# Patient Record
Sex: FEMALE | Employment: UNEMPLOYED | URBAN - METROPOLITAN AREA
[De-identification: names, ages, dates, MRNs, and addresses within clinical notes are randomized per-mention and may not be internally consistent; named-entity substitution may affect disease eponyms.]

---

## 2020-01-05 ENCOUNTER — OFFICE VISIT (OUTPATIENT)
Dept: URGENT CARE | Facility: URGENT CARE | Age: 35
End: 2020-01-05
Payer: COMMERCIAL

## 2020-01-05 VITALS
TEMPERATURE: 100 F | RESPIRATION RATE: 18 BRPM | WEIGHT: 170 LBS | SYSTOLIC BLOOD PRESSURE: 157 MMHG | HEIGHT: 65 IN | HEART RATE: 92 BPM | DIASTOLIC BLOOD PRESSURE: 90 MMHG | OXYGEN SATURATION: 98 % | BODY MASS INDEX: 28.32 KG/M2

## 2020-01-05 DIAGNOSIS — R42 DIZZINESS: ICD-10-CM

## 2020-01-05 DIAGNOSIS — R50.9 FEVER AND CHILLS: Primary | ICD-10-CM

## 2020-01-05 DIAGNOSIS — Z72.0 TOBACCO ABUSE: ICD-10-CM

## 2020-01-05 DIAGNOSIS — R20.0 LEFT ARM NUMBNESS: ICD-10-CM

## 2020-01-05 DIAGNOSIS — R11.0 NAUSEA: ICD-10-CM

## 2020-01-05 LAB
ALBUMIN UR-MCNC: NEGATIVE MG/DL
ANION GAP SERPL CALCULATED.3IONS-SCNC: 6 MMOL/L (ref 3–14)
APPEARANCE UR: CLEAR
BASOPHILS # BLD AUTO: 0 10E9/L (ref 0–0.2)
BASOPHILS NFR BLD AUTO: 0.2 %
BILIRUB UR QL STRIP: NEGATIVE
BUN SERPL-MCNC: 15 MG/DL (ref 7–30)
CALCIUM SERPL-MCNC: 8.9 MG/DL (ref 8.5–10.1)
CHLORIDE SERPL-SCNC: 102 MMOL/L (ref 94–109)
CO2 SERPL-SCNC: 27 MMOL/L (ref 20–32)
COLOR UR AUTO: YELLOW
CREAT SERPL-MCNC: 0.97 MG/DL (ref 0.52–1.04)
DIFFERENTIAL METHOD BLD: NORMAL
EOSINOPHIL # BLD AUTO: 0.4 10E9/L (ref 0–0.7)
EOSINOPHIL NFR BLD AUTO: 4 %
ERYTHROCYTE [DISTWIDTH] IN BLOOD BY AUTOMATED COUNT: 12.7 % (ref 10–15)
GFR SERPL CREATININE-BSD FRML MDRD: 75 ML/MIN/{1.73_M2}
GLUCOSE SERPL-MCNC: 143 MG/DL (ref 70–99)
GLUCOSE UR STRIP-MCNC: NEGATIVE MG/DL
HCG UR QL: NEGATIVE
HCT VFR BLD AUTO: 38.9 % (ref 35–47)
HGB BLD-MCNC: 13.2 G/DL (ref 11.7–15.7)
HGB UR QL STRIP: NEGATIVE
KETONES UR STRIP-MCNC: NEGATIVE MG/DL
LEUKOCYTE ESTERASE UR QL STRIP: NEGATIVE
LYMPHOCYTES # BLD AUTO: 3.5 10E9/L (ref 0.8–5.3)
LYMPHOCYTES NFR BLD AUTO: 32.4 %
MCH RBC QN AUTO: 30.2 PG (ref 26.5–33)
MCHC RBC AUTO-ENTMCNC: 33.9 G/DL (ref 31.5–36.5)
MCV RBC AUTO: 89 FL (ref 78–100)
MONOCYTES # BLD AUTO: 0.6 10E9/L (ref 0–1.3)
MONOCYTES NFR BLD AUTO: 5.6 %
NEUTROPHILS # BLD AUTO: 6.3 10E9/L (ref 1.6–8.3)
NEUTROPHILS NFR BLD AUTO: 57.8 %
NITRATE UR QL: NEGATIVE
PH UR STRIP: 5.5 PH (ref 5–7)
PLATELET # BLD AUTO: 309 10E9/L (ref 150–450)
POTASSIUM SERPL-SCNC: 3.5 MMOL/L (ref 3.4–5.3)
RBC # BLD AUTO: 4.37 10E12/L (ref 3.8–5.2)
RBC #/AREA URNS AUTO: NORMAL /HPF
SODIUM SERPL-SCNC: 135 MMOL/L (ref 133–144)
SOURCE: NORMAL
SP GR UR STRIP: 1.02 (ref 1–1.03)
TROPONIN I SERPL-MCNC: <0.015 UG/L (ref 0–0.04)
UROBILINOGEN UR STRIP-ACNC: 0.2 EU/DL (ref 0.2–1)
WBC # BLD AUTO: 10.9 10E9/L (ref 4–11)
WBC #/AREA URNS AUTO: NORMAL /HPF

## 2020-01-05 PROCEDURE — 84484 ASSAY OF TROPONIN QUANT: CPT | Performed by: FAMILY MEDICINE

## 2020-01-05 PROCEDURE — 93000 ELECTROCARDIOGRAM COMPLETE: CPT | Performed by: FAMILY MEDICINE

## 2020-01-05 PROCEDURE — 85025 COMPLETE CBC W/AUTO DIFF WBC: CPT | Performed by: FAMILY MEDICINE

## 2020-01-05 PROCEDURE — 99205 OFFICE O/P NEW HI 60 MIN: CPT | Performed by: FAMILY MEDICINE

## 2020-01-05 PROCEDURE — 81025 URINE PREGNANCY TEST: CPT | Performed by: FAMILY MEDICINE

## 2020-01-05 PROCEDURE — 81001 URINALYSIS AUTO W/SCOPE: CPT | Performed by: FAMILY MEDICINE

## 2020-01-05 PROCEDURE — 80048 BASIC METABOLIC PNL TOTAL CA: CPT | Performed by: FAMILY MEDICINE

## 2020-01-05 PROCEDURE — 36415 COLL VENOUS BLD VENIPUNCTURE: CPT | Performed by: FAMILY MEDICINE

## 2020-01-05 RX ORDER — ATORVASTATIN CALCIUM 20 MG/1
20 TABLET, FILM COATED ORAL
COMMUNITY
Start: 2019-01-25 | End: 2022-01-10

## 2020-01-05 RX ORDER — ALBUTEROL SULFATE 90 UG/1
1-2 AEROSOL, METERED RESPIRATORY (INHALATION)
COMMUNITY
Start: 2019-09-01

## 2020-01-05 RX ORDER — ESCITALOPRAM OXALATE 20 MG/1
40 TABLET ORAL
COMMUNITY
Start: 2018-03-31

## 2020-01-05 RX ORDER — ARIPIPRAZOLE 10 MG/1
10 TABLET ORAL
COMMUNITY
Start: 2018-03-06

## 2020-01-05 RX ORDER — FLUTICASONE PROPIONATE 50 MCG
50 SPRAY, SUSPENSION (ML) NASAL
COMMUNITY
Start: 2018-01-16

## 2020-01-05 ASSESSMENT — MIFFLIN-ST. JEOR: SCORE: 1466.99

## 2020-01-06 NOTE — PROGRESS NOTES
"SUBJECTIVE: Joanne Berrios is a 34 year old female presenting with a chief complaint of Left arm numbness after hand was sore now nausea and dizziness with feeling very anxious.  Of note, she did not notice a fever until after nurse took Temp.  Onset of symptoms was today ago.  Course of illness is same.    Severity moderate  Current and Associated symptoms: none  Treatment measures tried include None tried.  Predisposing factors include HX of anxiety.    Past Medical History:   Diagnosis Date     Anxiety      Depressive disorder      Hyperlipidemia        No past surgical history on file.    Family History   Problem Relation Age of Onset     Heart Disease Father        Social History     Tobacco Use     Smoking status: Not on file     Smokeless tobacco: Never Used   Substance Use Topics     Alcohol use: Not on file        Allergies   Allergen Reactions     Cats      Other  [No Clinical Screening - See Comments]      hayfever     Review Of Systems  Skin: negative  Eyes: negative  Ears/Nose/Throat: negative  Respiratory: No shortness of breath, dyspnea on exertion, cough, or hemoptysis  Cardiovascular: negative for CP  Gastrointestinal: negative  Genitourinary: negative  Musculoskeletal: as above  Neurologic: negative  Psychiatric: anxious      OBJECTIVE:  BP (!) 157/90   Pulse 92   Temp 100  F (37.8  C) (Tympanic)   Resp 18   Ht 1.651 m (5' 5\")   Wt 77.1 kg (170 lb)   SpO2 98%   BMI 28.29 kg/m  GENERAL APPEARANCE: healthy, alert and no distress  EYES: EOMI,  PERRL, conjunctiva clear  HENT: ear canals and TM's normal.  Nose and mouth without ulcers, erythema or lesions  NECK: supple, nontender, no lymphadenopathy  RESP: lungs clear to auscultation - no rales, rhonchi or wheezes  CV: regular rates and rhythm, normal S1 S2, no murmur noted  ABDOMEN:  soft, nontender, no HSM or masses and bowel sounds normal  NEURO: Normal strength and tone, sensory exam grossly normal,  normal speech and mentation  SKIN: no " suspicious lesions or rashes  No lower ext edema    EKG NSR without acute st changes      ICD-10-CM    1. Fever and chills R50.9 CBC with platelets differential     UA with Microscopic reflex to Culture     Troponin I     Basic metabolic panel     HCG qualitative urine     EKG 12-lead complete w/read - Clinics   2. Nausea R11.0 CBC with platelets differential     UA with Microscopic reflex to Culture     Troponin I     Basic metabolic panel     HCG qualitative urine     EKG 12-lead complete w/read - Clinics   3. Dizziness R42 CBC with platelets differential     UA with Microscopic reflex to Culture     Troponin I     Basic metabolic panel     HCG qualitative urine     EKG 12-lead complete w/read - Clinics   4. Left arm numbness R20.0 CBC with platelets differential     UA with Microscopic reflex to Culture     Troponin I     Basic metabolic panel     HCG qualitative urine     EKG 12-lead complete w/read - Clinics   5. Tobacco abuse Z72.0 CBC with platelets differential     UA with Microscopic reflex to Culture     Troponin I     Basic metabolic panel     HCG qualitative urine     EKG 12-lead complete w/read - Clinics     No MI by normal EKG and Troponin  OTC anti inflammatories for possible carpal tunnel  ED if worse  F/u PCP recheck

## 2022-01-10 ENCOUNTER — NURSE TRIAGE (OUTPATIENT)
Dept: NURSING | Facility: CLINIC | Age: 37
End: 2022-01-10
Payer: COMMERCIAL

## 2022-01-10 ENCOUNTER — OFFICE VISIT (OUTPATIENT)
Dept: INTERNAL MEDICINE | Facility: CLINIC | Age: 37
End: 2022-01-10
Payer: COMMERCIAL

## 2022-01-10 VITALS
OXYGEN SATURATION: 99 % | BODY MASS INDEX: 29.82 KG/M2 | DIASTOLIC BLOOD PRESSURE: 84 MMHG | WEIGHT: 179 LBS | HEIGHT: 65 IN | TEMPERATURE: 97.3 F | HEART RATE: 91 BPM | SYSTOLIC BLOOD PRESSURE: 126 MMHG | RESPIRATION RATE: 18 BRPM

## 2022-01-10 DIAGNOSIS — R30.0 DYSURIA: Primary | ICD-10-CM

## 2022-01-10 LAB
ALBUMIN UR-MCNC: NEGATIVE MG/DL
APPEARANCE UR: CLEAR
BACTERIA #/AREA URNS HPF: ABNORMAL /HPF
BILIRUB UR QL STRIP: NEGATIVE
COLOR UR AUTO: YELLOW
GLUCOSE UR STRIP-MCNC: NEGATIVE MG/DL
HGB UR QL STRIP: ABNORMAL
KETONES UR STRIP-MCNC: NEGATIVE MG/DL
LEUKOCYTE ESTERASE UR QL STRIP: NEGATIVE
NITRATE UR QL: NEGATIVE
PH UR STRIP: 7 [PH] (ref 5–7)
RBC #/AREA URNS AUTO: ABNORMAL /HPF
SP GR UR STRIP: <=1.005 (ref 1–1.03)
SQUAMOUS #/AREA URNS AUTO: ABNORMAL /LPF
UROBILINOGEN UR STRIP-ACNC: 0.2 E.U./DL
WBC #/AREA URNS AUTO: ABNORMAL /HPF

## 2022-01-10 PROCEDURE — 81001 URINALYSIS AUTO W/SCOPE: CPT | Performed by: NURSE PRACTITIONER

## 2022-01-10 PROCEDURE — 99213 OFFICE O/P EST LOW 20 MIN: CPT | Performed by: NURSE PRACTITIONER

## 2022-01-10 RX ORDER — PROPRANOLOL HYDROCHLORIDE 20 MG/1
20 TABLET ORAL 2 TIMES DAILY
COMMUNITY

## 2022-01-10 RX ORDER — CLONAZEPAM 0.5 MG/1
0.5 TABLET ORAL PRN
COMMUNITY

## 2022-01-10 ASSESSMENT — MIFFLIN-ST. JEOR: SCORE: 1497.82

## 2022-01-10 NOTE — PROGRESS NOTES
"  Assessment & Plan     Dysuria  - Urine was negative for obvious infectious source.  Small amount of blood was noted.  A few bacteria were noted.  Does not meet criteria for urine culture.  - Reviewed with patient.  Advised to monitor symptoms for now.  It is possible that this is an early onset UTI.  She does have some mild low back pain but no flank tenderness to palpation.  If her symptoms worsen or fail to improve she was advised to return for care or reach out to her urology clinic for further recommendations.  If ongoing symptoms consider CT abdomen pelvis stone protocol.  Patient agreeable to plan.  - UA macro with reflex to Microscopic and Culture - Clinc Collect    At the end of our visit, patient mentioned a chronic history of tailbone pain.  Endorses having had an injury to her tailbone years ago.  Given the context of time, patient was advised to follow-up with her primary or make a follow-up appointment to discuss.  Patient agreed to plan.  }     Tobacco Cessation:   reports that she has been smoking. She has never used smokeless tobacco.      BMI:   Estimated body mass index is 29.79 kg/m  as calculated from the following:    Height as of this encounter: 1.651 m (5' 5\").    Weight as of this encounter: 81.2 kg (179 lb).       See Patient Instructions    Return if symptoms worsen or fail to improve.    SLOANE Stevens CNP  [facility] 36 year [facility] who presents for the following health issues:    HPI     Genitourinary - Female  Onset/Duration: 2 days  Description:   Painful urination (Dysuria): YES           Frequency: YES  Blood in urine (Hematuria): YES  Delay in urine (Hesitency): no  Intensity: moderate  Progression of Symptoms:  worsening  Accompanying Signs & Symptoms:  Fever/chills: no  Flank pain: YES  Nausea and vomiting: no  Vaginal symptoms: none  Abdominal/Pelvic Pain: no  History:   History of frequent UTI s: YES  History " "of kidney stones: no  Sexually Active: YES  Possibility of pregnancy: No  Precipitating or alleviating factors: None  Therapies tried and outcome:  none     Patient is seen in clinic today for UTI concern.  Patient normally follows with [facility] however, this clinic was closer and available for an appointment today.    She reports urinary symptoms starting last night.  She has burning with urination at the very end of voiding.  Feels like she needs to void frequently though she had already voided.  Symptoms have been present for 1 day.  Slight lower back pain.  No fevers or chills.  She believes there is some blood in her urine.  She has a past medical history significant for chronic urinary infections and was previously on Keflex suppressive therapy.  She reports that this was stopped in 2020 and she had done pretty well since that time.  She reports that her last UTI was in June, 2021.    Review of Systems   CONSTITUTIONAL:NEGATIVE for fever, chills, change in weight  RESP:NEGATIVE for significant cough or SOB  CV: NEGATIVE for chest pain, palpitations or peripheral edema  : Urinary frequency with burning.  History of frequent UTIs in the past, previously on Keflex suppressive therapy.      Objective    /84 (BP Location: Left arm, Patient Position: Chair, Cuff Size: Adult Regular)   Pulse 91   Temp 97.3  F (36.3  C) (Temporal)   Resp 18   Ht 1.651 m (5' 5\")   Wt 81.2 kg (179 lb)   SpO2 99%   BMI 29.79 kg/m    Body mass index is 29.79 kg/m .     Physical Exam   GENERAL: healthy, alert and no distress  EYES: Eyes grossly normal to inspection, PERRL and conjunctivae and sclerae normal  HENT: ear canals and TM's normal, nose and mouth without ulcers or lesions  NECK: no adenopathy, no asymmetry, masses, or scars and thyroid normal to palpation  RESP: lungs clear to auscultation - no rales, rhonchi or wheezes  CV: regular rate and rhythm, normal S1 S2, no S3 or S4, no murmur, click or " rub, no peripheral edema and peripheral pulses strong  ABDOMEN: soft, nontender, no hepatosplenomegaly, no masses and bowel sounds normal  MS: no gross musculoskeletal defects noted, no edema  SKIN: no suspicious lesions or rashes  NEURO: Normal strength and tone, mentation intact and speech normal  PSYCH: mentation appears normal, affect normal/bright    -Care everywhere briefly reviewed

## 2022-01-10 NOTE — TELEPHONE ENCOUNTER
Triage Call    Pt calling to say she has had a UTI developing, and overnight her symptoms have gotten much worse.  Currently has frequency, blood in urine, pain and burning.  No current fever.    Triaged to See Physician within 24 hours, and Care Advice given per Urinary Symptoms Guideline.    Transferred caller to the  for help to arrange an appointment today.    Jenny Yu RN        Reason for Disposition    Urinating more frequently than usual (i.e., frequency)    Additional Information    Negative: Shock suspected (e.g., cold/pale/clammy skin, too weak to stand, low BP, rapid pulse)    Negative: Sounds like a life-threatening emergency to the triager    Negative: Followed a genital area injury    Negative: Followed a genital area injury (penis, scrotum)    Negative: Vaginal discharge    Negative: Pus (white, yellow) or bloody discharge from end of penis    Negative: [1] Taking antibiotic for urinary tract infection (UTI) AND [2] female    Negative: [1] Taking antibiotic for urinary tract infection (UTI) AND [2] male    Negative: [1] Discomfort (pain, burning or stinging) when passing urine AND [2] pregnant    Negative: [1] Discomfort (pain, burning or stinging) when passing urine AND [2] postpartum (from 0 to 6 weeks after delivery)    Negative: [1] Discomfort (pain, burning or stinging) when passing urine AND [2] female    Negative: [1] Discomfort (pain, burning or stinging) when passing urine AND [2] male    Negative: Pain or itching in the vulvar area    Negative: Pain in scrotum is main symptom    Negative: Blood in the urine is main symptom    Negative: Symptoms arising from use of a urinary catheter (Cage or Coude)    Negative: [1] Unable to urinate (or only a few drops) > 4 hours AND     [2] bladder feels very full (e.g., palpable bladder or strong urge to urinate)    Negative: [1] Decreased urination and [2] drinking very little AND [2] dehydration suspected (e.g., dark urine, no urine  > 12 hours, very dry mouth, very lightheaded)    Negative: Patient sounds very sick or weak to the triager    Negative: Fever > 100.4 F (38.0 C)    Protocols used: URINARY SYMPTOMS-A-AH

## 2022-01-10 NOTE — PATIENT INSTRUCTIONS
-Your urinalysis looked fine- no evidence of infection at this time.  Small blood  -Watch symptoms for now, if not improving or worsening, follow up

## 2022-09-19 ENCOUNTER — HOSPITAL ENCOUNTER (EMERGENCY)
Facility: CLINIC | Age: 37
Discharge: HOME | End: 2022-09-19
Attending: EMERGENCY MEDICINE | Admitting: EMERGENCY MEDICINE
Payer: COMMERCIAL

## 2022-09-19 ENCOUNTER — APPOINTMENT (OUTPATIENT)
Dept: GENERAL RADIOLOGY | Facility: CLINIC | Age: 37
End: 2022-09-19
Attending: EMERGENCY MEDICINE
Payer: COMMERCIAL

## 2022-09-19 VITALS
RESPIRATION RATE: 22 BRPM | OXYGEN SATURATION: 98 % | SYSTOLIC BLOOD PRESSURE: 129 MMHG | TEMPERATURE: 99 F | DIASTOLIC BLOOD PRESSURE: 87 MMHG | HEART RATE: 84 BPM

## 2022-09-19 DIAGNOSIS — R06.02 SHORTNESS OF BREATH: ICD-10-CM

## 2022-09-19 LAB
ALBUMIN SERPL-MCNC: 4.4 G/DL (ref 3.4–5)
ALP SERPL-CCNC: 68 U/L (ref 40–150)
ALT SERPL W P-5'-P-CCNC: 27 U/L (ref 0–50)
ANION GAP SERPL CALCULATED.3IONS-SCNC: 12 MMOL/L (ref 3–14)
AST SERPL W P-5'-P-CCNC: 13 U/L (ref 0–45)
ATRIAL RATE - MUSE: 94 BPM
BASOPHILS # BLD AUTO: 0 10E3/UL (ref 0–0.2)
BASOPHILS NFR BLD AUTO: 0 %
BILIRUB SERPL-MCNC: 0.4 MG/DL (ref 0.2–1.3)
BUN SERPL-MCNC: 11 MG/DL (ref 7–30)
CALCIUM SERPL-MCNC: 9.8 MG/DL (ref 8.5–10.1)
CHLORIDE BLD-SCNC: 104 MMOL/L (ref 94–109)
CO2 SERPL-SCNC: 20 MMOL/L (ref 20–32)
CREAT SERPL-MCNC: 0.8 MG/DL (ref 0.52–1.04)
D DIMER PPP FEU-MCNC: <0.27 UG/ML FEU (ref 0–0.5)
DIASTOLIC BLOOD PRESSURE - MUSE: NORMAL MMHG
EOSINOPHIL # BLD AUTO: 0.2 10E3/UL (ref 0–0.7)
EOSINOPHIL NFR BLD AUTO: 2 %
ERYTHROCYTE [DISTWIDTH] IN BLOOD BY AUTOMATED COUNT: 11.9 % (ref 10–15)
GFR SERPL CREATININE-BSD FRML MDRD: >90 ML/MIN/1.73M2
GLUCOSE BLD-MCNC: 122 MG/DL (ref 70–99)
HCG SERPL QL: NEGATIVE
HCT VFR BLD AUTO: 39.8 % (ref 35–47)
HGB BLD-MCNC: 13.6 G/DL (ref 11.7–15.7)
HOLD SPECIMEN: 0
HOLD SPECIMEN: NORMAL
IMM GRANULOCYTES # BLD: 0 10E3/UL
IMM GRANULOCYTES NFR BLD: 0 %
INTERPRETATION ECG - MUSE: NORMAL
LYMPHOCYTES # BLD AUTO: 2.6 10E3/UL (ref 0.8–5.3)
LYMPHOCYTES NFR BLD AUTO: 28 %
MCH RBC QN AUTO: 30.2 PG (ref 26.5–33)
MCHC RBC AUTO-ENTMCNC: 34.2 G/DL (ref 31.5–36.5)
MCV RBC AUTO: 88 FL (ref 78–100)
MONOCYTES # BLD AUTO: 0.6 10E3/UL (ref 0–1.3)
MONOCYTES NFR BLD AUTO: 7 %
NEUTROPHILS # BLD AUTO: 5.7 10E3/UL (ref 1.6–8.3)
NEUTROPHILS NFR BLD AUTO: 63 %
NRBC # BLD AUTO: 0 10E3/UL
NRBC BLD AUTO-RTO: 0 /100
NT-PROBNP SERPL-MCNC: 64 PG/ML (ref 0–450)
P AXIS - MUSE: 41 DEGREES
PLATELET # BLD AUTO: 364 10E3/UL (ref 150–450)
POTASSIUM BLD-SCNC: 3.2 MMOL/L (ref 3.4–5.3)
PR INTERVAL - MUSE: 132 MS
PROT SERPL-MCNC: 8.6 G/DL (ref 6.8–8.8)
QRS DURATION - MUSE: 82 MS
QT - MUSE: 352 MS
QTC - MUSE: 440 MS
R AXIS - MUSE: 45 DEGREES
RBC # BLD AUTO: 4.5 10E6/UL (ref 3.8–5.2)
SODIUM SERPL-SCNC: 136 MMOL/L (ref 133–144)
SYSTOLIC BLOOD PRESSURE - MUSE: NORMAL MMHG
T AXIS - MUSE: 27 DEGREES
TROPONIN I SERPL HS-MCNC: <3 NG/L
VENTRICULAR RATE- MUSE: 94 BPM
WBC # BLD AUTO: 9.1 10E3/UL (ref 4–11)

## 2022-09-19 PROCEDURE — 99285 EMERGENCY DEPT VISIT HI MDM: CPT | Mod: 25

## 2022-09-19 PROCEDURE — 80053 COMPREHEN METABOLIC PANEL: CPT | Performed by: EMERGENCY MEDICINE

## 2022-09-19 PROCEDURE — 93005 ELECTROCARDIOGRAM TRACING: CPT

## 2022-09-19 PROCEDURE — 96360 HYDRATION IV INFUSION INIT: CPT

## 2022-09-19 PROCEDURE — 84703 CHORIONIC GONADOTROPIN ASSAY: CPT | Performed by: EMERGENCY MEDICINE

## 2022-09-19 PROCEDURE — 85025 COMPLETE CBC W/AUTO DIFF WBC: CPT | Performed by: EMERGENCY MEDICINE

## 2022-09-19 PROCEDURE — 84484 ASSAY OF TROPONIN QUANT: CPT | Performed by: EMERGENCY MEDICINE

## 2022-09-19 PROCEDURE — 258N000003 HC RX IP 258 OP 636: Performed by: EMERGENCY MEDICINE

## 2022-09-19 PROCEDURE — 85379 FIBRIN DEGRADATION QUANT: CPT | Performed by: EMERGENCY MEDICINE

## 2022-09-19 PROCEDURE — 36415 COLL VENOUS BLD VENIPUNCTURE: CPT | Performed by: EMERGENCY MEDICINE

## 2022-09-19 PROCEDURE — 83880 ASSAY OF NATRIURETIC PEPTIDE: CPT | Performed by: EMERGENCY MEDICINE

## 2022-09-19 PROCEDURE — 71046 X-RAY EXAM CHEST 2 VIEWS: CPT

## 2022-09-19 PROCEDURE — 250N000013 HC RX MED GY IP 250 OP 250 PS 637: Performed by: EMERGENCY MEDICINE

## 2022-09-19 RX ORDER — POTASSIUM CHLORIDE 1500 MG/1
20 TABLET, EXTENDED RELEASE ORAL ONCE
Status: COMPLETED | OUTPATIENT
Start: 2022-09-19 | End: 2022-09-19

## 2022-09-19 RX ADMIN — SODIUM CHLORIDE 1000 ML: 9 INJECTION, SOLUTION INTRAVENOUS at 18:53

## 2022-09-19 RX ADMIN — POTASSIUM CHLORIDE 20 MEQ: 1500 TABLET, EXTENDED RELEASE ORAL at 18:54

## 2022-09-19 ASSESSMENT — ENCOUNTER SYMPTOMS
ABDOMINAL PAIN: 0
CHILLS: 0
SORE THROAT: 0
SHORTNESS OF BREATH: 1
MYALGIAS: 0
NAUSEA: 1
VOMITING: 0
FEVER: 0
WHEEZING: 0
COUGH: 1
BLOOD IN STOOL: 0
RHINORRHEA: 0

## 2022-09-19 ASSESSMENT — ACTIVITIES OF DAILY LIVING (ADL): ADLS_ACUITY_SCORE: 35

## 2022-09-19 NOTE — ED TRIAGE NOTES
Patient states she does suffer from anxiety that has been well managed over the last few weeks but has gotten worse since the start of her symptoms.      Triage Assessment     Row Name 09/19/22 1231       Respiratory WDL    Respiratory WDL --  reports SOB over the last week

## 2022-09-19 NOTE — ED PROVIDER NOTES
History   Chief Complaint:  Shortness of Breath     The history is provided by the patient.      Joanne Brerios is a 37 year old female with history of anxiety, depression, hypertension, hyperlipidemia, BUZZ on CPAP, GERD, anemia, and suicidal ideation who presents with shortness of breath. The patient reports that she began experiencing shortness of breath about 4 days ago. She endorses nausea in the mornings and a cough. She states her hypertension medication, Nifedipine, was recently changed to 90 mg. Patient notes she is currently trying to get pregnant and her Mirena taken out 2 weeks ago. She endorses a rapid heart beat of 124 bpm. She states these symptoms started about a week ago, but notes she's had hypertension for a while. The patient reports a family history of heart disease. She states her father, who exercised often, had heart issues. She reports a history of severe anxiety and depression, but no personal history of heart problems, lung problems, or other medical problems. She reportedly did not take a pregnancy test today and notes she had a cortisone injection in her tailbone recently. She states she smokes marijuana, but denies vaping. At this time, the patient reports no chills, fever, myalgias, loss of taste, or runny nose. She reports no sore throat, vomiting, abdominal pain, or blood in stools. Patient denies leg swelling or wheezing.     Review of Systems   Constitutional: Negative for chills and fever.   HENT: Negative for rhinorrhea and sore throat.    Respiratory: Positive for cough and shortness of breath. Negative for wheezing.    Cardiovascular: Negative for leg swelling.   Gastrointestinal: Positive for nausea. Negative for abdominal pain, blood in stool and vomiting.   Musculoskeletal: Negative for myalgias.   All other systems reviewed and are negative.      Allergies:  No Known Drug Allergies    Medications:  Albuterol  inhaler  Abilify  Lipitor  Klonopin  Ellura  Lexapro  Flonase  Prilosec  Inderal  CPAP  Nifedipine    Past Medical History:     GERD  Hyperlipidemia   Hypertension   JOSE A  Panic disorder without agoraphobia  Depression    Adjustment disorder with anxious mood  Social anxiety disorder  BUZZ on CPAP  Cerebral dysfunction  Overweight  HPV  Vitamin D deficiency  Anemia  Suicidal ideation  HSV    Past Surgical History:    [place] teeth extraction     Family History:    Father: CAD, diabetes, high cholesterol, hyperlipidemia, JOSE A, prophylactic mastectomy    Social History:  The patient presents to the ED alone  PCP: Rena Craft MD      Physical Exam     Patient Vitals for the past 24 hrs:   BP Temp Temp src Pulse Resp SpO2   09/19/22 1915 (!) 142/86 -- -- 88 22 99 %   09/19/22 1820 (!) 144/97 -- -- -- -- 99 %   09/19/22 1232 -- -- -- 112 -- --   09/19/22 1230 (!) 177/105 99  F (37.2  C) Temporal 118 24 100 %       Physical Exam  Constitutional: Well appearing.  HEENT: Atraumatic.  Moist mucous membranes.  Neck: Soft.  Supple.  No JVD.  Cardiac: Regular rate and rhythm.  No murmur or rub.  Respiratory: Clear to auscultation bilaterally.  No respiratory distress.  No wheezing, rhonchi, or rales.  Abdomen: Soft and nontender. Nondistended.  Musculoskeletal: No edema.  Normal range of motion.  Neurologic: Alert and oriented x3.  Normal tone and bulk.  Normal gait.  Skin: No rashes.  No edema.  Psych: Normal affect.  Normal behavior.      Emergency Department Course   ECG  ECG results from 09/19/22   EKG 12 lead     Value    Systolic Blood Pressure     Diastolic Blood Pressure     Ventricular Rate 94    Atrial Rate 94    CA Interval 132    QRS Duration 82        QTc 440    P Axis 41    R AXIS 45    T Axis 27    Interpretation ECG      Sinus rhythm  Normal ECG  No previous ECGs available  Confirmed by GENERATED REPORT, COMPUTER ([id number]),  Angelique Sotomayor ([id number]) on 9/19/2022 7:03:13 PM       Imaging:  XR Chest 2 Views    Final Result   IMPRESSION: Negative chest.        Report per radiology    Laboratory:  Labs Ordered and Resulted from Time of ED Arrival to Time of ED Departure   COMPREHENSIVE METABOLIC PANEL - Abnormal       Result Value    Sodium 136      Potassium 3.2 (*)     Chloride 104      Carbon Dioxide (CO2) 20      Anion Gap 12      Urea Nitrogen 11      Creatinine 0.80      Calcium 9.8      Glucose 122 (*)     Alkaline Phosphatase 68      AST 13      ALT 27      Protein Total 8.6      Albumin 4.4      Bilirubin Total 0.4      GFR Estimate >90     HCG QUALITATIVE PREGNANCY - Normal    hCG Serum Qualitative Negative     D DIMER QUANTITATIVE - Normal    D-Dimer Quantitative <0.27     TROPONIN I - Normal    Troponin I High Sensitivity <3     NT PROBNP INPATIENT - Normal    N terminal Pro BNP Inpatient 64     CBC WITH PLATELETS AND DIFFERENTIAL    WBC Count 9.1      RBC Count 4.50      Hemoglobin 13.6      Hematocrit 39.8      MCV 88      MCH 30.2      MCHC 34.2      RDW 11.9      Platelet Count 364      % Neutrophils 63      % Lymphocytes 28      % Monocytes 7      % Eosinophils 2      % Basophils 0      % Immature Granulocytes 0      NRBCs per 100 WBC 0      Absolute Neutrophils 5.7      Absolute Lymphocytes 2.6      Absolute Monocytes 0.6      Absolute Eosinophils 0.2      Absolute Basophils 0.0      Absolute Immature Granulocytes 0.0      Absolute NRBCs 0.0        Emergency Department Course:     Reviewed:  I reviewed nursing notes, vitals, past medical history and Care Everywhere    Assessments:  1834 I obtained history and examined the patient as noted above.   1940 I rechecked the patient and explained findings.     Interventions:  1853 NS 1000 mL IV  1854 Klor-Con M 20 mEq PO    Disposition:  The patient was discharged to home.     Impression & Plan     Medical Decision Making:  Joanne Berrios is a 37-year-old woman who is afebrile and hemodynamically stable.  EKG demonstrates a normal sinus rhythm with no acute  ischemic changes on my read.  Lab work-up as noted as above and is grossly unrevealing including CBC, metabolic panel, troponin, D-dimer, and BNP.  She is low risk Wells criteria for PE.  Chest x-ray is unremarkable for acute disease.  I offered COVID testing, however, she declined.  Unclear etiology of her shortness of breath, however, her SPO2 has been 99 to 100% her entire visit.  When I was obtaining history, she developed some shortness of breath and began breathing quickly but had SPO2 100%.  I do lengthy discussion with the patient and her significant other at the bedside.  At this point, we see no clear etiology of shortness of breath or her tachycardia, however, she has no hypoxia with a negative work-up and her heart rate improved to a normal rate spontaneously.  We discussed very close primary care follow-up, potential Holter monitoring testing, and further work-up.  Their questions were answered.  Return precautions were given and she was in no distress at time of discharge.    Diagnosis:    ICD-10-CM    1. Shortness of breath  R06.02      Discharge Medications:  New Prescriptions    No medications on file     Scribe Disclosure:  I, Tomás Arias, am serving as a scribe at 6:37 PM on 9/19/2022 to document services personally performed by Mukesh Hutton MD based on my observations and the provider's statements to me.          Mukesh Hutton MD  09/21/22 8995

## 2022-09-19 NOTE — LETTER
September 19, 2022      To Whom It May Concern:      Joanne Berrios was seen in our Emergency Department today, 09/19/22.  I expect her condition to improve over the next 2 days.  She may return to work when improved.    Sincerely,        PETROS Reina RN

## 2022-09-19 NOTE — ED TRIAGE NOTES
Patient comes in with HTN, and SOB over the last week. She also reports increased HR. She just had her BP medications adjusted yesterday.  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  Denies dizziness or CP.      Triage Assessment     Row Name 09/19/22 1231       Respiratory WDL    Respiratory WDL --  reports SOB over the last week